# Patient Record
Sex: MALE | Race: WHITE
[De-identification: names, ages, dates, MRNs, and addresses within clinical notes are randomized per-mention and may not be internally consistent; named-entity substitution may affect disease eponyms.]

---

## 2019-04-22 ENCOUNTER — HOSPITAL ENCOUNTER (EMERGENCY)
Dept: HOSPITAL 50 - VM.ED | Age: 34
Discharge: HOME | End: 2019-04-22
Payer: SELF-PAY

## 2019-04-22 DIAGNOSIS — T15.02XA: Primary | ICD-10-CM

## 2019-04-22 NOTE — EDM.PDOC
ED HPI GENERAL MEDICAL PROBLEM





- General


Chief Complaint: Eye Problems


Stated Complaint: PIECE OF METAL IN EYE


Time Seen by Provider: 04/22/19 10:46


Source of Information: Reports: Patient


History Limitations: Reports: No Limitations





- History of Present Illness


INITIAL COMMENTS - FREE TEXT/NARRATIVE: 





Patient presents with complaints of metal in his left eye since last night.  

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  States there was a 

small piece at the 8 o'clock orientation of his eye.   No nausea, vomiting, 

blurry vision, no double vision or pain/pressure to left side of eye or head.  

No other complaints.


Onset Date: 04/21/19





- Related Data


 Allergies











Allergy/AdvReac Type Severity Reaction Status Date / Time


 


No Known Allergies Allergy   Verified 12/29/17 14:16











Home Meds: 


 Home Meds





. [No Known Home Meds]  12/26/17 [History]











Past Medical History





- Past Health History


Medical/Surgical History: Denies Medical/Surgical History





ED ROS GENERAL





- Review of Systems


Review Of Systems: See Below


Constitutional: Reports: No Symptoms


HEENT: Reports: Eye Pain


Respiratory: Reports: No Symptoms


Cardiovascular: Reports: No Symptoms


Endocrine: Reports: No Symptoms


GI/Abdominal: Reports: No Symptoms


: Reports: No Symptoms


Musculoskeletal: Reports: No Symptoms


Skin: Reports: No Symptoms


Neurological: Reports: No Symptoms


Psychiatric: Reports: No Symptoms


Hematologic/Lymphatic: Reports: No Symptoms


Immunologic: Reports: No Symptoms





ED EXAM GENERAL W FULL EYE





- Physical Exam


Exam: See Below


Exam Limited By: No Limitations


General Appearance: Alert, WD/WN, Mild Distress


Eye Exam: Left Eye: Foreign Body


Visual Acuity (L) 20/: 25


With Correction: No


Cornea Exam: Left: Foreign Body, Examined with Flourescein


Extraocular Movements: Bilateral: Intact


Pupils: Normal Accommodation


Pupillary Size: Left: 4 mm


Pupillary Reaction: Left: Brisk


Anterior Chamber: Bilateral: Normal Appearance


Posterior Chamber: Bilateral: Normal Funduscopic


Neurological: Alert, Oriented, CN II-XII Intact, Normal Cognition, Normal Gait, 

Normal Reflexes, No Motor/Sensory Deficits





ED EYE w/ Add Procedure





- Eye Procedure


Alcaine Drops Administered: Yes


Eye FB Removal: Other (removal with forcep and eye spud)


Eye Irrigated w/ Saline (ccs): 100


Antibiotic Oinment/Drps Admin: Left Eye





Course





- Orders/Labs/Meds


Orders: 





 Active Orders 24 hr











 Category Date Time Status


 


 Proparacaine [Proparacaine 0.5% Ophth Soln] Med  04/22/19 10:47 Ordered





 1 ml EYELF ASDIRECTED PRN   








 Medication Orders





Proparacaine HCl (Proparacaine 0.5% Ophth Soln)  1 ml EYELF ASDIRECTED PRN


   PRN Reason: Other


   Last Admin: 04/22/19 10:53  Dose: 3 drop








Meds: 





Medications











Generic Name Dose Route Start Last Admin





  Trade Name Freq  PRN Reason Stop Dose Admin


 


Proparacaine HCl  1 ml  04/22/19 10:47  04/22/19 10:53





  Proparacaine 0.5% Ophth Soln  EYELF   3 drop





  ASDIRECTED PRN   Administration





  Other   





     





     





     














Discontinued Medications














Generic Name Dose Route Start Last Admin





  Trade Name Freq  PRN Reason Stop Dose Admin


 


Fluorescein Sodium  1 mg  04/22/19 10:47  04/22/19 10:54





  Ful-Barb  EYELF  04/22/19 10:48  1 mg





  ONETIME ONE   Administration





     





     





     





     














- Re-Assessments/Exams


Free Text/Narrative Re-Assessment/Exam: 





04/22/19 11:52


After left eye is anesthetized using proparacaine, fluorescein is applied and 

examined for corneal abrasions.  I did not appreciate any.  There was however a 

small metallic foreign object to the left lower lateral side of cornea.  This 

was removed utilizing gauze, forceps and eye spud.  It does appear that 

everything has been was removed.  Flush with 150 mL normal saline to produce a 

small object in the drain basin.  Did recommend follow up with ophthalmology 

tomorrow or return to ED if increased pain.  Tolerated procedure well, no 

complications.








Departure





- Departure


Time of Disposition: 11:47


Disposition: Home, Self-Care 01


Condition: Good


Clinical Impression: 


 Foreign body, eye








- Discharge Information


*PRESCRIPTION DRUG MONITORING PROGRAM REVIEWED*: No


*COPY OF PRESCRIPTION DRUG MONITORING REPORT IN PATIENT JENN: No


Instructions:  Eye Foreign Body, Easy-to-Read


Referrals: 


PCP,None [Primary Care Provider] - 


Forms:  ED Department Discharge


Additional Instructions: 


Plan





1. Follow up with eye doctor tomorrow.  Call and make an appointment today.  

Tell them you had a foreign body removed in the ER but that I recommend follow 

up tomorrow.  Also tell them you are on Cipro eye drops to prevent infection.





2. Make sure to do the above. 





3. Return to the ER for any additional problems or symptoms.





4. Take tylenol and ibuprofen for pain.  Alternate.





5. Please call the ER if you have any additional questions or concerns.





- Problem List & Annotations


(1) Foreign body, eye


SNOMED Code(s): 301384707, 577194324


   Code(s): T15.90XA - FOREIGN BODY ON EXTERNAL EYE, PART UNSP, UNSP EYE, INIT 

  Status: Acute   Priority: Medium   Current Visit: Yes   


Qualifiers: 


   Encounter type: initial encounter   Laterality: left   Qualified Code(s): 

T15.92XA - Foreign body on external eye, part unspecified, left eye, initial 

encounter   





- Problem List Review


Problem List Initiated/Reviewed/Updated: Yes





- My Orders


Last 24 Hours: 





My Active Orders





04/22/19 10:47


Proparacaine [Proparacaine 0.5% Ophth Soln]   1 ml EYELF ASDIRECTED PRN 














- Assessment/Plan


Last 24 Hours: 





My Active Orders





04/22/19 10:47


Proparacaine [Proparacaine 0.5% Ophth Soln]   1 ml EYELF ASDIRECTED PRN 











Assessment:: 





foreign body of the left eye(metal)


Plan: 





Plan





1. Follow up with eye doctor tomorrow.  Call and make an appointment today.  

Tell them you had a foreign body removed in the ER but that I recommend follow 

up tomorrow.  Also tell them you are on Cipro eye drops to prevent infection.





2. Make sure to do the above. 





3. Return to the ER for any additional problems or symptoms.





4. Take tylenol and ibuprofen for pain.  Alternate.





5. Please call the ER if you have any additional questions or concerns.

## 2019-09-14 ENCOUNTER — HOSPITAL ENCOUNTER (EMERGENCY)
Dept: HOSPITAL 50 - VM.ED | Age: 34
Discharge: HOME | End: 2019-09-14
Payer: SELF-PAY

## 2019-09-14 DIAGNOSIS — K02.9: Primary | ICD-10-CM

## 2019-09-14 DIAGNOSIS — F17.210: ICD-10-CM

## 2019-09-14 DIAGNOSIS — K00.7: ICD-10-CM

## 2019-09-14 DIAGNOSIS — K03.81: ICD-10-CM

## 2019-09-14 PROCEDURE — 99282 EMERGENCY DEPT VISIT SF MDM: CPT

## 2019-09-15 NOTE — EDM.PDOC
ED HPI GENERAL MEDICAL PROBLEM





- General


Chief Complaint: ENT Problem


Time Seen by Provider: 09/14/19 14:14


Source of Information: Reports: Patient


History Limitations: Reports: No Limitations





- History of Present Illness


INITIAL COMMENTS - FREE TEXT/NARRATIVE: 





Pt. presents to ER with complaints of dental pain. He states that he has been 

experiencing this for several weeks, worse in the past several days. He has a 

fractured R rear molar that he feels as though is causing the problem. No fever 

or chills. No issues with swallowing. 


Onset Date: 09/05/19


Location: Reports: Face


Quality: Reports: Ache


  ** Right Lower Tooth/Teeth


Pain Score (Numeric/FACES): 10





- Related Data


 Allergies











Allergy/AdvReac Type Severity Reaction Status Date / Time


 


No Known Allergies Allergy   Verified 09/14/19 14:25











Home Meds: 


 Home Meds





. [No Known Home Meds]  12/26/17 [History]











Past Medical History





- Past Health History


Medical/Surgical History: Denies Medical/Surgical History





Social & Family History





- Tobacco Use


Smoking Status *Q: Current Every Day Smoker


Years of Tobacco use: 10


Packs/Tins Daily: 0.5





ED ROS GENERAL





- Review of Systems


Review Of Systems: See Below


HEENT: Reports: Dental Pain





ED EXAM, GENERAL





- Physical Exam


Exam: See Below


Exam Limited By: No Limitations


General Appearance: Alert, WD/WN, No Apparent Distress


Throat/Mouth: Other (Poor dentition. Fracture of R real molar. No large abscess 

noted. No edema to hypopharynx.)





Course





- Vital Signs


Last Recorded V/S: 





 Last Vital Signs











Temp  37.3 C   09/14/19 14:18


 


Pulse  99   09/14/19 14:18


 


Resp  16   09/14/19 14:18


 


BP  139/86   09/14/19 14:18


 


Pulse Ox  99   09/14/19 14:18














- Orders/Labs/Meds


Meds: 





Medications














Discontinued Medications














Generic Name Dose Route Start Last Admin





  Trade Name Freq  PRN Reason Stop Dose Admin


 


Amoxicillin/Clavulanate Potassium  2 packet  09/14/19 14:23  09/14/19 14:36





  Take Home: Amox/Clavulanate 875-12, 2 Tab Pac  PO  09/14/19 14:24  2 packet





  ONETIME ONE   Administration





     





     





     





     


 


Tramadol HCl  2 packet  09/14/19 14:23  09/14/19 14:36





  Take Home: Tramadol 50 Mg, 4 Tab Pack  PO  09/14/19 14:24  2 packet





  ONETIME ONE   Administration





     





     





     





     














Departure





- Departure


Time of Disposition: 14:40


Disposition: Home, Self-Care 01


Condition: Good


Clinical Impression: 


 Dental caries








- Discharge Information


Instructions:  Amoxicillin; Clavulanic Acid tablets, Tramadol tablets, Dental 

Abscess, Probiotics


Referrals: 


PCP,None [Primary Care Provider] - 


Forms:  ED Department Discharge


Additional Instructions: 


Augmentin 875mg


1 twice daily for 10 days





Tramadol 50mg 


1 every 4-6 hours as needed for pain





Ibuprofen 200mg


4 tabs every 8 hours for pain





Follow-up with dentist ASAP.





- Problem List Review


Problem List Initiated/Reviewed/Updated: Yes





- Assessment/Plan


Plan: 





Augmentin 875mg


1 twice daily for 10 days





Tramadol 50mg 


1 every 4-6 hours as needed for pain





Ibuprofen 200mg


4 tabs every 8 hours for pain





Follow-up with dentist ASAP.

## 2021-03-21 ENCOUNTER — HOSPITAL ENCOUNTER (EMERGENCY)
Dept: HOSPITAL 50 - VM.ED | Age: 36
Discharge: HOME | End: 2021-03-21
Payer: SELF-PAY
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
## 2021-03-21 DIAGNOSIS — S05.01XA: Primary | ICD-10-CM

## 2021-03-21 DIAGNOSIS — S05.02XA: ICD-10-CM

## 2021-03-21 DIAGNOSIS — H16.133: ICD-10-CM

## 2021-03-21 RX ADMIN — GENTAMICIN SULFATE ONE: 3 OINTMENT OPHTHALMIC at 13:10

## 2021-03-21 RX ADMIN — BACITRACIN ZINC AND POLYMYXIN B SULFATE ONE: 500; 10000 OINTMENT OPHTHALMIC at 13:10

## 2021-03-21 RX ADMIN — TETRACAINE HYDROCHLORIDE ONE DOSE: 5 SOLUTION OPHTHALMIC at 12:21

## 2021-03-21 RX ADMIN — FLUORESCEIN SODIUM ONE MG: 1 STRIP OPHTHALMIC at 12:21

## 2021-03-21 RX ADMIN — TOBRAMYCIN AND DEXAMETHASONE ONE BOTTLE: 3; 1 SUSPENSION/ DROPS OPHTHALMIC at 13:11
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
## 2022-06-29 ENCOUNTER — HOSPITAL ENCOUNTER (EMERGENCY)
Dept: HOSPITAL 50 - VM.ED | Age: 37
Discharge: HOME | End: 2022-06-29
Payer: SELF-PAY

## 2022-06-29 DIAGNOSIS — N13.2: Primary | ICD-10-CM

## 2022-06-29 LAB
ANION GAP SERPL CALC-SCNC: 22.2 MMOL/L (ref 5–15)
CHLORIDE SERPL-SCNC: 101 MMOL/L (ref 98–107)
EGFRCR SERPLBLD CKD-EPI 2021: 62 ML/MIN (ref 60–?)
SODIUM SERPL-SCNC: 140 MMOL/L (ref 136–145)

## 2022-06-29 PROCEDURE — 82150 ASSAY OF AMYLASE: CPT

## 2022-06-29 PROCEDURE — 96361 HYDRATE IV INFUSION ADD-ON: CPT

## 2022-06-29 PROCEDURE — 80053 COMPREHEN METABOLIC PANEL: CPT

## 2022-06-29 PROCEDURE — 81001 URINALYSIS AUTO W/SCOPE: CPT

## 2022-06-29 PROCEDURE — 83690 ASSAY OF LIPASE: CPT

## 2022-06-29 PROCEDURE — 96374 THER/PROPH/DIAG INJ IV PUSH: CPT

## 2022-06-29 PROCEDURE — 85025 COMPLETE CBC W/AUTO DIFF WBC: CPT

## 2022-06-29 PROCEDURE — 96375 TX/PRO/DX INJ NEW DRUG ADDON: CPT

## 2022-06-29 PROCEDURE — 86140 C-REACTIVE PROTEIN: CPT

## 2022-06-29 PROCEDURE — 99284 EMERGENCY DEPT VISIT MOD MDM: CPT

## 2022-06-29 PROCEDURE — 74176 CT ABD & PELVIS W/O CONTRAST: CPT

## 2022-08-13 ENCOUNTER — HOSPITAL ENCOUNTER (EMERGENCY)
Dept: HOSPITAL 50 - VM.ED | Age: 37
LOS: 1 days | Discharge: HOME | End: 2022-08-14
Payer: SELF-PAY
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
## 2022-08-13 DIAGNOSIS — S00.12XA: ICD-10-CM

## 2022-08-13 DIAGNOSIS — T15.92XA: Primary | ICD-10-CM

## 2023-03-16 ENCOUNTER — HOSPITAL ENCOUNTER (EMERGENCY)
Dept: HOSPITAL 50 - VM.ED | Age: 38
Discharge: HOME | End: 2023-03-16
Payer: SELF-PAY

## 2023-03-16 DIAGNOSIS — Z72.0: ICD-10-CM

## 2023-03-16 DIAGNOSIS — M77.8: Primary | ICD-10-CM

## 2023-03-16 RX ADMIN — KETOROLAC TROMETHAMINE ONE MG: 30 INJECTION, SOLUTION INTRAMUSCULAR at 18:36

## 2023-03-16 RX ADMIN — NAPROXEN ONE PACKET: 500 TABLET ORAL at 19:20

## 2025-05-12 ENCOUNTER — HOSPITAL ENCOUNTER (EMERGENCY)
Dept: HOSPITAL 50 - VM.ED | Age: 40
Discharge: HOME | End: 2025-05-12
Payer: SELF-PAY

## 2025-05-12 DIAGNOSIS — Z79.899: ICD-10-CM

## 2025-05-12 DIAGNOSIS — K04.7: Primary | ICD-10-CM

## 2025-05-12 DIAGNOSIS — L03.211: ICD-10-CM

## 2025-05-12 DIAGNOSIS — Z88.8: ICD-10-CM
